# Patient Record
Sex: MALE | Race: BLACK OR AFRICAN AMERICAN | Employment: STUDENT | URBAN - METROPOLITAN AREA
[De-identification: names, ages, dates, MRNs, and addresses within clinical notes are randomized per-mention and may not be internally consistent; named-entity substitution may affect disease eponyms.]

---
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
## 2019-08-15 ENCOUNTER — TELEPHONE (OUTPATIENT)
Dept: PEDIATRICS | Facility: CLINIC | Age: 19
End: 2019-08-15

## 2019-08-15 NOTE — TELEPHONE ENCOUNTER
Reason for Call:  Other / wcc/appointment    Detailed comments: Jero, patient's father called to schedule a wcc for patient. Agent informed patient's father, however, patient is already 19 and has not been seen by Dr Garsia since 2017.  Patient's father requested a message sent to Dr Garsia since he stated she would still see patient. Patient's father also stated a text message can be sent to his ludin phone but not a voice message. Please call patient's father back to discuss.    Phone Number Patient can be reached at: Cell number on file:    Telephone Information:   Mobile [phone]         Best Time: Anytime    Can we leave a detailed message on this number? NO    Call taken on 8/15/2019 at 4:01 PM by Jodie Dave

## 2019-08-19 NOTE — TELEPHONE ENCOUNTER
Talked to dad and let him know that Dr. Garsia will be back in clinic tomorrow, will call back with response.     Dr. Garsia, willing to see this pt?    Monica Baron RN

## 2019-08-19 NOTE — TELEPHONE ENCOUNTER
Patient's father calling again to check on status of request below. Please contact him to discuss.

## 2019-08-20 NOTE — TELEPHONE ENCOUNTER
I called father and he will bring Long in to see me on Thursday morning in one of my available slots.

## 2019-08-22 ENCOUNTER — OFFICE VISIT (OUTPATIENT)
Dept: PEDIATRICS | Facility: CLINIC | Age: 19
End: 2019-08-22
Payer: COMMERCIAL

## 2019-08-22 VITALS
TEMPERATURE: 97.5 F | SYSTOLIC BLOOD PRESSURE: 139 MMHG | DIASTOLIC BLOOD PRESSURE: 87 MMHG | WEIGHT: 131.8 LBS | BODY MASS INDEX: 18.45 KG/M2 | HEART RATE: 55 BPM | HEIGHT: 71 IN

## 2019-08-22 DIAGNOSIS — F41.1 GENERALIZED ANXIETY DISORDER: Primary | ICD-10-CM

## 2019-08-22 DIAGNOSIS — Z23 VACCINE FOR SINGLE BACTERIAL DISEASE: ICD-10-CM

## 2019-08-22 PROCEDURE — 90471 IMMUNIZATION ADMIN: CPT | Performed by: PEDIATRICS

## 2019-08-22 PROCEDURE — 90620 MENB-4C VACCINE IM: CPT | Performed by: PEDIATRICS

## 2019-08-22 PROCEDURE — 99215 OFFICE O/P EST HI 40 MIN: CPT | Mod: 25 | Performed by: PEDIATRICS

## 2019-08-22 ASSESSMENT — ANXIETY QUESTIONNAIRES
IF YOU CHECKED OFF ANY PROBLEMS ON THIS QUESTIONNAIRE, HOW DIFFICULT HAVE THESE PROBLEMS MADE IT FOR YOU TO DO YOUR WORK, TAKE CARE OF THINGS AT HOME, OR GET ALONG WITH OTHER PEOPLE: SOMEWHAT DIFFICULT
5. BEING SO RESTLESS THAT IT IS HARD TO SIT STILL: NOT AT ALL
GAD7 TOTAL SCORE: 11
2. NOT BEING ABLE TO STOP OR CONTROL WORRYING: NEARLY EVERY DAY
6. BECOMING EASILY ANNOYED OR IRRITABLE: MORE THAN HALF THE DAYS
3. WORRYING TOO MUCH ABOUT DIFFERENT THINGS: MORE THAN HALF THE DAYS
7. FEELING AFRAID AS IF SOMETHING AWFUL MIGHT HAPPEN: SEVERAL DAYS
1. FEELING NERVOUS, ANXIOUS, OR ON EDGE: MORE THAN HALF THE DAYS

## 2019-08-22 ASSESSMENT — MIFFLIN-ST. JEOR: SCORE: 1627.84

## 2019-08-22 ASSESSMENT — PATIENT HEALTH QUESTIONNAIRE - PHQ9
5. POOR APPETITE OR OVEREATING: SEVERAL DAYS
SUM OF ALL RESPONSES TO PHQ QUESTIONS 1-9: 8

## 2019-08-22 NOTE — PATIENT INSTRUCTIONS
"For anxiety:    Https://[email or web address].com/watch?v=[email or web address]    Practice following along with a Youtube guided imagery for breath-focused meditation.    If you want a longer video, search using the following term \"guided meditation 20 minutes\"    If you feel like you want more support for your moods, consider going to the Wellness center at [facility].  "

## 2019-08-22 NOTE — PROGRESS NOTES
Subjective    Long Sahu is a 19 year old male who presents to clinic today by himself for a Meningitis B vaccine, and to discuss moods.      HPI   Mental Health Follow-up Visit for Anxiety    How is your mood today? Its okay, but has been struggling with anxiety.    Change in symptoms since last visit: Stable, has been finding ways to keep it control like running a lot    New symptoms since last visit:  Irritable, lack of interest    Problems taking medications: not taking meds    Who else is on your mental health care team? Primary Care Provider    +++++++++++++++++++++++++++++++++++++++++++++++++++++++++++++++  In the past two weeks have you had thoughts of suicide or self-harm?  No.    Do you have concerns about your personal safety or the safety of others?   No    Home and School     Have there been any big changes at home? No    Are you having challenges at school?   No.  Going into his sophomore year in college.  Noticing that he's struggling with anxiety.  Finds that when he runs, his anxiety feels like it's much better, but at other times, it's hard to control.  Denies desire for self-harm.  Social Supports:     Parents, but he's at [employer] so far from home.  No real close friends at college yet.  Sleep:    Hours of sleep on a school night: 8-10 hours  Substance abuse:    None  Maladaptive coping strategies:    None  Other Stressors: None    Coping strategies:  Reading, talking to someone.      ABNER-7 SCORE 8/22/2019   Total Score 11         Review of Systems  GENERAL:  NEGATIVE for fever, poor appetite, and sleep disruption.  SKIN:  NEGATIVE for rash, hives, and eczema.  EYE:  NEGATIVE for pain, discharge, redness, itching and vision problems.  ENT:  NEGATIVE for ear pain, runny nose, congestion and sore throat.  RESP:  NEGATIVE for cough, wheezing, and difficulty breathing.  CARDIAC:  NEGATIVE for chest pain and cyanosis.   GI:  NEGATIVE for vomiting, diarrhea, abdominal pain and  constipation.  :  NEGATIVE for urinary problems.  NEURO:  NEGATIVE for headache and weakness.  ALLERGY:  As in Allergy History  MSK:  NEGATIVE for muscle problems and joint problems.    Problem List  Patient Active Problem List    Diagnosis Date Noted     Adjustment disorder with anxious mood 01/12/2016     Priority: Medium     Facial tic 03/03/2013     Priority: Medium     Wart 10/15/2012     Priority: Medium     Pes planus 08/23/2012     Priority: Medium     Nevus 08/23/2012     Priority: Medium      Medications    No current outpatient medications on file prior to visit.  No current facility-administered medications on file prior to visit.   Allergies  Allergies   Allergen Reactions     Walnuts [Nuts]      Reviewed and updated as needed this visit by Provider           Objective    There were no vitals taken for this visit.  Normalized weight-for-age data not available for patients older than 20 years.    Physical Exam  GENERAL: Active, alert, in no acute distress.  SKIN: Clear. No significant rash, abnormal pigmentation or lesions  HEAD: Normocephalic.  EYES:  No discharge or erythema. Normal pupils and EOM.  EARS: Normal canals. Tympanic membranes are normal; gray and translucent.  NOSE: Normal without discharge.  MOUTH/THROAT: Clear. No oral lesions. Teeth intact without obvious abnormalities.  NECK: Supple, no masses.  LYMPH NODES: No adenopathy  LUNGS: Clear. No rales, rhonchi, wheezing or retractions  HEART: Regular rhythm. Normal S1/S2. No murmurs.  ABDOMEN: Soft, non-tender, not distended, no masses or hepatosplenomegaly. Bowel sounds normal.     Diagnostics: None      Assessment & Plan    1. Generalized anxiety disorder  ABNER score today is an 11.  Long is not interested in medication at this point in time.  In clinic, I had him try a 5-minute guided meditation (on Youtube), and he stated that it made him very relaxed.  He is willing to practice this every day to begin to train his nervous system to  shut off his stress response.  I let him know that if this isn't enough to help him with his anxiety, and it's getting in the way of him doing things he wants to do in college, he can go to the [facility] and establish care with a therapist there.  He agreed with the plan.    2. Vaccine for single bacterial disease    - MEN B, IM (10 - 25 YRS) - Bexsero    Follow Up   Over the winter holiday to see how his anxiety is doing.  Spent over 50% in face-to-face health counseling regarding stress reduction, sleep, and relaxation techniques.  Total visit time: 40 minutes.      Andressa Garsia MD

## 2019-08-23 ASSESSMENT — ANXIETY QUESTIONNAIRES: GAD7 TOTAL SCORE: 11

## 2020-01-30 ENCOUNTER — OFFICE VISIT (OUTPATIENT)
Dept: PEDIATRICS | Facility: CLINIC | Age: 20
End: 2020-01-30
Payer: COMMERCIAL

## 2020-01-30 VITALS
HEART RATE: 47 BPM | TEMPERATURE: 97.5 F | DIASTOLIC BLOOD PRESSURE: 68 MMHG | SYSTOLIC BLOOD PRESSURE: 120 MMHG | HEIGHT: 71 IN | WEIGHT: 135.4 LBS | BODY MASS INDEX: 18.96 KG/M2

## 2020-01-30 DIAGNOSIS — S86.891A RIGHT MEDIAL TIBIAL STRESS SYNDROME, INITIAL ENCOUNTER: ICD-10-CM

## 2020-01-30 DIAGNOSIS — F41.1 GAD (GENERALIZED ANXIETY DISORDER): Primary | ICD-10-CM

## 2020-01-30 PROCEDURE — 99214 OFFICE O/P EST MOD 30 MIN: CPT | Performed by: PEDIATRICS

## 2020-01-30 RX ORDER — ESCITALOPRAM OXALATE 5 MG/1
5 TABLET ORAL DAILY
Qty: 30 TABLET | Refills: 3 | Status: SHIPPED | OUTPATIENT
Start: 2020-01-30 | End: 2020-02-29

## 2020-01-30 ASSESSMENT — ANXIETY QUESTIONNAIRES
2. NOT BEING ABLE TO STOP OR CONTROL WORRYING: MORE THAN HALF THE DAYS
7. FEELING AFRAID AS IF SOMETHING AWFUL MIGHT HAPPEN: MORE THAN HALF THE DAYS
1. FEELING NERVOUS, ANXIOUS, OR ON EDGE: SEVERAL DAYS
GAD7 TOTAL SCORE: 9
6. BECOMING EASILY ANNOYED OR IRRITABLE: SEVERAL DAYS
5. BEING SO RESTLESS THAT IT IS HARD TO SIT STILL: NOT AT ALL
IF YOU CHECKED OFF ANY PROBLEMS ON THIS QUESTIONNAIRE, HOW DIFFICULT HAVE THESE PROBLEMS MADE IT FOR YOU TO DO YOUR WORK, TAKE CARE OF THINGS AT HOME, OR GET ALONG WITH OTHER PEOPLE: SOMEWHAT DIFFICULT
3. WORRYING TOO MUCH ABOUT DIFFERENT THINGS: MORE THAN HALF THE DAYS

## 2020-01-30 ASSESSMENT — MIFFLIN-ST. JEOR: SCORE: 1641.04

## 2020-01-30 ASSESSMENT — PATIENT HEALTH QUESTIONNAIRE - PHQ9
SUM OF ALL RESPONSES TO PHQ QUESTIONS 1-9: 11
5. POOR APPETITE OR OVEREATING: SEVERAL DAYS

## 2020-01-30 NOTE — PROGRESS NOTES
Subjective    Long Leonides Sahu is a 19 year old male who presents to clinic today with mother because of:  RECHECK     HPI   Mental Health Follow-up Visit for Anxiety    How is your mood today? Mix of good and bad    Change in symptoms since last visit: same    New symptoms since last visit:  Consultation seems not helpful per patient, still having same symptoms, difficulty talking to people, more irritated, hasn't been running a lot due to R shin pain, but been doing other activities to distract himself    Problems taking medications: No    Who else is on your mental health care team? Therapist and Primary Care Provider    +++++++++++++++++++++++++++++++++++++++++++++++++++++++++++++++    PHQ 8/22/2019   PHQ-A Total Score 8   PHQ-A Depressed most days in past year Yes   PHQ-A Mood affect on daily activities Somewhat difficult   PHQ-A Suicide Ideation past 2 weeks Not at all   PHQ-A Suicide Ideation past month No   PHQ-A Previous suicide attempt No     ABNER-7 SCORE 8/22/2019   Total Score 11     Generalized anxiety disorder:    Has always struggled with social anxiety, but never felt the need to seek treatment until he developed a shin splint and this caused him to not be able to run anymore, and he discovered that running was not only a pleasurable activity that he loves, but that it was THE major way he manages his anxiety and stress. He does not have other prosocial coping strategies that he has been able to use to manage his anxiety, and therefore  wanting not only care for his shin splint, but would like help in managing his anxiety moving forward.    Other contributors to his stress:  He just finished a semester at [employer] in [place], and took both physics and Latin, both of which proved to be very difficult for him.  He passed, but was unhappy with how low his grades were, and is now also doubting his ability to pursue Engineering as a major.  This is making him feel very insecure about  his future.  Denies self-harm.  Denies substance use.  Denies feeling like he's a threat to himself or others.  Is able to communicate about is feelings with his parents, who have bee very supportive.        Right shin pain:    On medial aspect, worse with running, okay with walking.  Able to bear weight.    Has been progressing over the past several weeks.    No overlying skin changes, no history of trauma to the area.    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    Has been evaluated by athletic trainers at his college who recommended rest for 2 to 4 weeks and crosstraining but patient is apprehensive as he feels like this is a long time.    Teammates in the past and said that experienced runners do not get shinsplints and so he feels like he is not a good enough runner, and has some negative self-talk about this.    Does have a pool, elliptical, stationary bike available at school that he would be able to use      Review of Systems  GENERAL:  NEGATIVE for fever, poor appetite, and sleep disruption.  SKIN:  NEGATIVE for rash, hives, and eczema.  EYE:  NEGATIVE for pain, discharge, redness, itching and vision problems.  ENT:  NEGATIVE for ear pain, runny nose, congestion and sore throat.  RESP:  NEGATIVE for cough, wheezing, and difficulty breathing.  CARDIAC:  NEGATIVE for chest pain and cyanosis.   GI:  NEGATIVE for vomiting, diarrhea, abdominal pain and constipation.  :  NEGATIVE for urinary problems.  NEURO:  NEGATIVE for headache and weakness.  ALLERGY:  As in Allergy History  MSK:  NEGATIVE for muscle problems and joint problems.    Problem List  Patient Active Problem List    Diagnosis Date Noted     Adjustment disorder with anxious mood 01/12/2016     Priority: Medium     Facial tic 03/03/2013     Priority: Medium     Wart 10/15/2012     Priority: Medium     Pes planus 08/23/2012     Priority: Medium     Nevus 08/23/2012     Priority: Medium      Medications  No  "current outpatient medications on file prior to visit.  No current facility-administered medications on file prior to visit.     Allergies  Allergies   Allergen Reactions     Walnuts [Nuts]      Reviewed and updated as needed this visit by Provider           Objective    /68   Pulse (!) 47   Temp 97.5  F (36.4  C) (Oral)   Ht 5' 10.67\" (1.795 m)   Wt 135 lb 6.4 oz (61.4 kg)   BMI 19.06 kg/m    Normalized weight-for-age data not available for patients older than 20 years.    Physical Exam  GENERAL: Active, alert, in no acute distress.  SKIN: Clear. No significant rash, abnormal pigmentation or lesions  HEAD: Normocephalic.  EYES:  No discharge or erythema. Normal pupils and EOM.  NOSE: Normal without discharge.  MOUTH/THROAT: Clear. No oral lesions. Teeth intact without obvious abnormalities.  NECK: Supple, no masses.  LYMPH NODES: No adenopathy  LUNGS: Clear. No rales, rhonchi, wheezing or retractions  HEART: Regular rhythm. Normal S1/S2. No murmurs.  ABDOMEN: Soft, non-tender, not distended, no masses or hepatosplenomegaly. Bowel sounds normal.   EXTREMS:  FROM, 2+ strength throughout. No point tenderness along right tibia.    Diagnostics: None      Assessment & Plan    1. ABNER (generalized anxiety disorder)  Discussed with Long alone, and then with his mother in the room, how I thought he would benefit from starting a low dose of an anti-anxiety and anti-depressant medication, in addition to seeing a therapist for CBT so that he develops healthy coping strategies to manage his anxiety.  I also stressed that he will need to find a clinician who can monitor his medication (at least every 2 weeks, to make sure he's responding well, and not having side effects). His mother will help him find a therapist in [place].    - escitalopram (LEXAPRO) 5 MG tablet; Take 1 tablet (5 mg) by mouth daily  Dispense: 30 tablet; Refill: 3    - MENTAL HEALTH REFERRAL  - Adult; Outpatient Treatment; " Individual/Couples/Family/Group Therapy/Health Psychology; Other: Not Listed - Enter Referral Details in Scheduling Comments Below    2. Right medial tibial stress syndrome, initial encounter  Discussed the importance of rest for the full 4 weeks, and that if he goes back to running and feels pain, he's gone back too soon.  In the meantime, he can use an Elliptical or swim to keep physically conditioned, even though these are not as stress-relieving as running has been for him.      Follow Up  With primary care clinician for mood and medicaiton check in 2 weeks.    Spent over 50% in face-to-face health counseling.  Total visit time:40  minutes.       Andressa Garsia MD

## 2020-01-31 ASSESSMENT — ANXIETY QUESTIONNAIRES: GAD7 TOTAL SCORE: 9

## 2020-02-07 ENCOUNTER — TELEPHONE (OUTPATIENT)
Dept: PEDIATRICS | Facility: CLINIC | Age: 20
End: 2020-02-07

## 2020-02-07 NOTE — TELEPHONE ENCOUNTER
Reason for Call:  Other prescription    Detailed comments: Patient mom called and stated that patient called in twice and was transferred to care team and call was dropped. Per mom, patient had side affects from taking escitalopram (LEXAPRO) and he stopped taking them. Mom is requesting for call back from nurse. Please advise.     Phone Number Patient can be reached at: Cell number on file:    Telephone Information:   Mobile [phone]   Mobile [phone]       Best Time: Anytime    Can we leave a detailed message on this number? YES    Call taken on 2/7/2020 at 2:38 PM by Polly Clarke     Patient requests all Lab and Radiology Results on their Discharge Instructions

## 2020-02-07 NOTE — TELEPHONE ENCOUNTER
"Spoke with mom, no consent to communicate on file only got information from mom. Mom states that he was taking the Lexapro once a day, on the 2nd day started feeling \"edgy and just kind of off\" Mom states that he took for a couple more days (she thinks a total of 4 days) and he has stopped taking it now. Mom states that it seems like he is doing better, he is in a mindfulness program and is enjoying his classes at school more. They are wondering if the medication is still needed. Ok to wait for Dr. Garsia to return to clinic next week to address.   Long's cell phone is [phone].     Annita Brooke RN     "

## 2020-02-13 NOTE — TELEPHONE ENCOUNTER
"I called mother and shared that I support his decision to stop the medication since he was having side effects.  I did stress, however, that I do think Long has an anxiety disorder, and that I absolutely want him to see a therapist weekly while he's in college, because I'm concerned that he was \"self-medicating\" his anxiety with running.  Mother expresses understanding, and will make sure he starts seeing a therapist.  "

## 2020-02-13 NOTE — TELEPHONE ENCOUNTER
Reason for Call:  Other / Returning call    Detailed comments: Patient's mom called and stated she was returning Dr Garsia's call.    Phone Number Patient can be reached at: Cell number on file:    Telephone Information:       Mobile [phone]       Best Time: Anytime    Can we leave a detailed message on this number? YES    Call taken on 2/13/2020 at 9:36 AM by Jodie Dave

## 2020-02-15 NOTE — TELEPHONE ENCOUNTER
"Reason for Call:  Other call back    Detailed comments: Patient father calling to speak to  about patients being on escitalopram (LEXAPRO) 5 MG tablet, states patient has gone \"down hill\" since he started medication.    Phone Number Patient can be reached at: Cell number on file:    Telephone Information:   Mobile [phone]           Best Time: any     Can we leave a detailed message on this number? YES    Call taken on 2/15/2020 at 12:42 PM by Anupama Ramirez      "

## 2020-02-19 NOTE — TELEPHONE ENCOUNTER
I called father and had a long conversation with him about my concern that Long has a significant anxiety disorder that I believe he was controlling by physical activity (running) and now that he has a stress fracture, the anxiety has gotten worse.  I reiterated my support that he stopped the medication due to side effects, but that I recommended that he see both a psychiatrist and a psychologist in [place] for evaluation and treatment of his anxiety.  Father expressed understanding.

## 2020-02-28 ENCOUNTER — TELEPHONE (OUTPATIENT)
Dept: PEDIATRICS | Facility: CLINIC | Age: 20
End: 2020-02-28

## 2020-02-28 NOTE — TELEPHONE ENCOUNTER
Reason for Call:  Other     Detailed comments: mom would like to talk with Dr Garsia about the patient seeing a therapist. Mom says that the patient needs help and she needs to speak to PCP as soon as possible. Mom was told that Dr Garsia is out of the clinic until next Tuesday     Phone Number Patient can be reached at: Other phone number: [phone]    Best Time: any    Can we leave a detailed message on this number? YES    Call taken on 2/28/2020 at 1:06 PM by Danna Figueroa

## 2020-02-29 NOTE — TELEPHONE ENCOUNTER
I called and spoke with mother on Saturday morning at 9am.  I let her know that, in reviewing Long's chart, I am concerned that he may be struggling with a form of an eating disorder where he is managing his anxiety through excessive exercise (running), and at the same time, is not able to take in adequate calories to allow normal brain and heart functioning.  Mother expressed understanding, and will call her insurance on Monday to see if the [facility] or [facility] Eating disorders treatment program are covered, and then make an appointment for Long to be evaluated there.

## 2020-02-29 NOTE — TELEPHONE ENCOUNTER
Reason for call:  Other   Patient called regarding (reason for call): call back  Additional comments: Father would like a call back to go over the details  discussed with his  Wife     Phone number to reach patient:  Cell number on file:    Telephone Information:   Mobile [phone]           Best Time:      Can we leave a detailed message on this number?  YES    Travel screening:

## 2020-02-29 NOTE — TELEPHONE ENCOUNTER
Spoke with dad. He is requesting to talk with Dr. Garsia about the conversation from earlier today. States that back in January they were given referrals for mental health and now it seems like there are concerns for an eating disorder. Dad is confused if Dr. Garsia has changed her mind or if this is in addition to the mental health concerns. At this point dad requested for RN to get a message to Dr. Garsia for a call back.     Annita Brooke RN

## 2020-03-03 NOTE — TELEPHONE ENCOUNTER
"I called father and explained that in reviewing Long's chart, his longstanding anxiety, his low weight, his low heart rate, his stress fracture, and the fact that his anxiety got so much worse when he had to stop running due to his stress fracture, all of this makes me highly suspicious that he may have a form of an eating disorder.  Mother did confirm that he prefers a \"plant-based\" diet, too.  I am recommending that he be evaluated for an eating disorder at any clinic or program in the [facility] that is covered by the parents' insurance, and then we can discuss treatment options after that. I also shared that if he does need treatment, he may need to take a medical leave of absence from his college ([employer] in [place], [place]).  Father expressed understanding.  "

## 2020-04-13 ENCOUNTER — TELEPHONE (OUTPATIENT)
Dept: PEDIATRICS | Facility: CLINIC | Age: 20
End: 2020-04-13

## 2020-04-13 NOTE — TELEPHONE ENCOUNTER
Wanted to update . Did not want to say more. Is ok with  calling on Thursday to discuss.    Gem Delgadillo RN

## 2020-04-13 NOTE — TELEPHONE ENCOUNTER
Reason for Call:  Other     Detailed comments: father would like to talk to Dr Garsia      Phone Number Patient can be reached at: Home number on file [phone]     Best Time: ANY    Can we leave a detailed message on this number? YES    Call taken on 4/13/2020 at 9:04 AM by Danna Figueroa

## 2020-04-14 NOTE — TELEPHONE ENCOUNTER
"I called to get an update from the parents about Long.    Parents aren't sure that Long really has an eating disorder (over-exercise to manage his anxiety), despite my explaining that sometimes what we call \"eating disorders\" aren't exactly about not eating, but not eating enough to keep up with the body's metabolic needs, as might be the case with him as a runner.  I reviewed how I was concerned that his HR has dropped from 55 last August, to 47 this past January, and that he had a stress fracture.    Now studying from home (was at [employer]).  Physical activity:  He's riding his bicycle and doing some light running.    I then shared with parents that I am happy to refer Long to [facility] to get an evaluation of his anxiety, and be connected with a therapist for CBT. I asked parents to convey to them my concern about him \"self-medicating\" by doing excessive exercise. Parents agreed.  Mother asked about the need for medication (he was tried by me on a low dose of escitalopram, but he felt it made him worse, so we stopped it).  I shared that after he has an assessment through [facility], his therapist will determine if he might benefit from an additional consultation with an adult psychiatrist to see what role, if any, medication might play in his care.    Finally, mother asked about help for herself to manage her own worries in dealing with Long's anxiety.  I recommended \"Parenting from the Heart\" by Dr. Mc Calvin.    They will have Long follow up with me by phone a couple of weeks after establishing care with a therapist.  "

## 2020-05-29 ENCOUNTER — TELEPHONE (OUTPATIENT)
Dept: PEDIATRICS | Facility: CLINIC | Age: 20
End: 2020-05-29

## 2020-05-29 NOTE — TELEPHONE ENCOUNTER
Dr. Garcia would like Dr. Garsia to call and coordinate care, aware not until next week. Call cell [phone].    Nurse is faxing over RAHUL.    Dilcia Godoy RN

## 2020-05-29 NOTE — TELEPHONE ENCOUNTER
Reason for Call:  Other call back    Detailed comments:  nurses will like to coordinate care with  in regards to mutual patient.    Phone Number Patient can be reached at: Other phone number:  [phone]  Best Time: any     Can we leave a detailed message on this number? Not Applicable    Call taken on 5/29/2020 at 10:35 AM by Anupama Ramirez

## 2020-06-02 NOTE — TELEPHONE ENCOUNTER
I called psychiatrist Dr. Garcia to discuss her assessment of Long, and she shared that he endorsed paranoia and auditory hallucinations, in addition to struggling with activities of daily living. She is worried about schizoaffective disorder vs. Schizophrenia, and would like him to undergo some labwork to rule out organic causes of psychosis.  We agreed that I will call Long's father and discuss the concerns, and have dad bring him in for an evaluation.      Dr. Garcia would like to start Long on Abilify 5 mg po q D, but if father is not ready to start this medication, Dr. Garcia will speak with the family.

## 2020-06-23 DIAGNOSIS — R44.0 VERBAL AUDITORY HALLUCINATIONS: Primary | ICD-10-CM

## 2020-06-24 ENCOUNTER — ANCILLARY PROCEDURE (OUTPATIENT)
Dept: GENERAL RADIOLOGY | Facility: CLINIC | Age: 20
End: 2020-06-24
Attending: PEDIATRICS
Payer: COMMERCIAL

## 2020-06-24 DIAGNOSIS — R44.0 VERBAL AUDITORY HALLUCINATIONS: ICD-10-CM

## 2020-06-24 LAB
BASOPHILS # BLD AUTO: 0 10E9/L (ref 0–0.2)
BASOPHILS NFR BLD AUTO: 0.4 %
DIFFERENTIAL METHOD BLD: NORMAL
EOSINOPHIL # BLD AUTO: 0.1 10E9/L (ref 0–0.7)
EOSINOPHIL NFR BLD AUTO: 0.9 %
ERYTHROCYTE [DISTWIDTH] IN BLOOD BY AUTOMATED COUNT: 12 % (ref 10–15)
ERYTHROCYTE [SEDIMENTATION RATE] IN BLOOD BY WESTERGREN METHOD: 6 MM/H (ref 0–15)
HCT VFR BLD AUTO: 42.3 % (ref 40–53)
HGB BLD-MCNC: 14.6 G/DL (ref 13.3–17.7)
LYMPHOCYTES # BLD AUTO: 2 10E9/L (ref 0.8–5.3)
LYMPHOCYTES NFR BLD AUTO: 37.8 %
MCH RBC QN AUTO: 31.8 PG (ref 26.5–33)
MCHC RBC AUTO-ENTMCNC: 34.5 G/DL (ref 31.5–36.5)
MCV RBC AUTO: 92 FL (ref 78–100)
MONOCYTES # BLD AUTO: 0.3 10E9/L (ref 0–1.3)
MONOCYTES NFR BLD AUTO: 5.8 %
NEUTROPHILS # BLD AUTO: 2.9 10E9/L (ref 1.6–8.3)
NEUTROPHILS NFR BLD AUTO: 55.1 %
PLATELET # BLD AUTO: 250 10E9/L (ref 150–450)
RBC # BLD AUTO: 4.59 10E12/L (ref 4.4–5.9)
VIT B12 SERPL-MCNC: 703 PG/ML (ref 193–986)
WBC # BLD AUTO: 5.3 10E9/L (ref 4–11)

## 2020-06-24 PROCEDURE — 82390 ASSAY OF CERULOPLASMIN: CPT | Performed by: PEDIATRICS

## 2020-06-24 PROCEDURE — 82607 VITAMIN B-12: CPT | Performed by: PEDIATRICS

## 2020-06-24 PROCEDURE — 36415 COLL VENOUS BLD VENIPUNCTURE: CPT | Performed by: PEDIATRICS

## 2020-06-24 PROCEDURE — 80050 GENERAL HEALTH PANEL: CPT | Performed by: PEDIATRICS

## 2020-06-24 PROCEDURE — 82306 VITAMIN D 25 HYDROXY: CPT | Performed by: PEDIATRICS

## 2020-06-24 PROCEDURE — 71046 X-RAY EXAM CHEST 2 VIEWS: CPT | Mod: TC

## 2020-06-24 PROCEDURE — 82728 ASSAY OF FERRITIN: CPT | Performed by: PEDIATRICS

## 2020-06-24 PROCEDURE — 82747 ASSAY OF FOLIC ACID RBC: CPT | Mod: 90 | Performed by: PEDIATRICS

## 2020-06-24 PROCEDURE — 85652 RBC SED RATE AUTOMATED: CPT | Performed by: PEDIATRICS

## 2020-06-24 PROCEDURE — 86780 TREPONEMA PALLIDUM: CPT | Performed by: PEDIATRICS

## 2020-06-24 PROCEDURE — 86038 ANTINUCLEAR ANTIBODIES: CPT | Performed by: PEDIATRICS

## 2020-06-24 PROCEDURE — 99000 SPECIMEN HANDLING OFFICE-LAB: CPT | Performed by: PEDIATRICS

## 2020-06-24 PROCEDURE — 87389 HIV-1 AG W/HIV-1&-2 AB AG IA: CPT | Performed by: PEDIATRICS

## 2020-06-25 LAB
ALBUMIN SERPL-MCNC: 4.4 G/DL (ref 3.4–5)
ALP SERPL-CCNC: 87 U/L (ref 40–150)
ALT SERPL W P-5'-P-CCNC: 26 U/L (ref 0–70)
ANION GAP SERPL CALCULATED.3IONS-SCNC: 7 MMOL/L (ref 3–14)
AST SERPL W P-5'-P-CCNC: 25 U/L (ref 0–45)
BILIRUB SERPL-MCNC: 0.6 MG/DL (ref 0.2–1.3)
BUN SERPL-MCNC: 17 MG/DL (ref 7–30)
CALCIUM SERPL-MCNC: 9.4 MG/DL (ref 8.5–10.1)
CERULOPLASMIN SERPL-MCNC: 27 MG/DL (ref 20–60)
CHLORIDE SERPL-SCNC: 103 MMOL/L (ref 94–109)
CO2 SERPL-SCNC: 27 MMOL/L (ref 20–32)
CREAT SERPL-MCNC: 0.73 MG/DL (ref 0.66–1.25)
DEPRECATED CALCIDIOL+CALCIFEROL SERPL-MC: 24 UG/L (ref 20–75)
FERRITIN SERPL-MCNC: 42 NG/ML (ref 26–388)
GFR SERPL CREATININE-BSD FRML MDRD: >90 ML/MIN/{1.73_M2}
GLUCOSE SERPL-MCNC: 91 MG/DL (ref 70–99)
HIV 1+2 AB+HIV1 P24 AG SERPL QL IA: NONREACTIVE
POTASSIUM SERPL-SCNC: 3.9 MMOL/L (ref 3.4–5.3)
PROT SERPL-MCNC: 8.4 G/DL (ref 6.8–8.8)
SODIUM SERPL-SCNC: 137 MMOL/L (ref 133–144)
T PALLIDUM AB SER QL: NONREACTIVE
TSH SERPL DL<=0.005 MIU/L-ACNC: 2.07 MU/L (ref 0.4–4)

## 2020-06-26 LAB
ANA SER QL IF: NEGATIVE
FOLATE RBC-MCNC: 658 NG/ML
HCT VFR BLD CALC: 42.3 %

## 2020-07-20 ENCOUNTER — TELEPHONE (OUTPATIENT)
Dept: PEDIATRICS | Facility: CLINIC | Age: 20
End: 2020-07-20

## 2020-07-20 NOTE — TELEPHONE ENCOUNTER
Reason for Call:  Other     Detailed comments: Ashleigh thompson [facility] requesting for nurse to call with lab results that were dated end of June. Please advise.     Phone Number Patient can be reached at: Other phone number:  [phone]    Best Time: Anytime     Can we leave a detailed message on this number? YES    Call taken on 7/20/2020 at 1:28 PM by Polly Clarke

## 2020-07-20 NOTE — TELEPHONE ENCOUNTER
Spoke with Ashleigh at [facility]. Requesting lab results be sent to them. Previously talked with  regarding this patient and labs.    Faxed to [phone].    Gem Delgadillo RN